# Patient Record
Sex: MALE | Race: WHITE | Employment: OTHER | URBAN - METROPOLITAN AREA
[De-identification: names, ages, dates, MRNs, and addresses within clinical notes are randomized per-mention and may not be internally consistent; named-entity substitution may affect disease eponyms.]

---

## 2024-03-28 PROBLEM — M12.811 ROTATOR CUFF ARTHROPATHY OF RIGHT SHOULDER: Status: ACTIVE | Noted: 2024-03-28

## 2024-03-28 PROBLEM — I11.9 HYPERTENSIVE LEFT VENTRICULAR HYPERTROPHY: Status: ACTIVE | Noted: 2024-03-28

## 2024-03-28 PROBLEM — M23.90 INTERNAL DERANGEMENT OF KNEE: Status: ACTIVE | Noted: 2024-03-28

## 2024-03-28 PROBLEM — I77.89 ENLARGED THORACIC AORTA (CMS-HCC): Status: ACTIVE | Noted: 2024-03-28

## 2024-03-28 PROBLEM — M75.22 BICEPS TENDINITIS OF LEFT UPPER EXTREMITY: Status: ACTIVE | Noted: 2024-03-28

## 2024-03-28 PROBLEM — Q25.43 AORTIC ROOT ANEURYSM: Status: ACTIVE | Noted: 2024-03-28

## 2024-03-28 PROBLEM — R07.9 CHEST PAIN: Status: ACTIVE | Noted: 2024-03-28

## 2024-03-28 PROBLEM — Z86.718 HISTORY OF DVT (DEEP VEIN THROMBOSIS): Status: ACTIVE | Noted: 2024-03-28

## 2024-03-28 PROBLEM — I26.99 PULMONARY EMBOLISM (MULTI): Status: ACTIVE | Noted: 2024-03-28

## 2024-03-28 PROBLEM — M25.512 LEFT SHOULDER PAIN: Status: ACTIVE | Noted: 2024-03-28

## 2024-03-28 PROBLEM — J32.9 SINUS INFECTION: Status: ACTIVE | Noted: 2024-03-28

## 2024-03-28 PROBLEM — S83.90XA KNEE SPRAIN: Status: ACTIVE | Noted: 2024-03-28

## 2024-03-28 PROBLEM — M71.169 SEPTIC PREPATELLAR BURSITIS: Status: ACTIVE | Noted: 2024-03-28

## 2024-03-28 PROBLEM — I77.810 ASCENDING AORTA DILATION (CMS-HCC): Status: ACTIVE | Noted: 2024-03-28

## 2024-03-28 PROBLEM — E78.2 MIXED HYPERLIPIDEMIA: Status: ACTIVE | Noted: 2024-03-28

## 2024-03-28 PROBLEM — C18.9 COLON CANCER (MULTI): Status: ACTIVE | Noted: 2024-03-28

## 2024-03-28 PROBLEM — J31.0 RHINITIS, CHRONIC: Status: ACTIVE | Noted: 2024-03-28

## 2024-03-28 PROBLEM — K22.2 SCHATZKI'S RING: Status: ACTIVE | Noted: 2024-03-28

## 2024-03-28 PROBLEM — M25.511 RIGHT SHOULDER PAIN: Status: ACTIVE | Noted: 2024-03-28

## 2024-03-28 PROBLEM — M17.9 KNEE OSTEOARTHRITIS: Status: ACTIVE | Noted: 2024-03-28

## 2024-03-28 PROBLEM — M25.569 KNEE PAIN: Status: ACTIVE | Noted: 2024-03-28

## 2024-03-28 PROBLEM — I80.9 THROMBOPHLEBITIS: Status: ACTIVE | Noted: 2024-03-28

## 2024-03-28 PROBLEM — I10 ESSENTIAL HYPERTENSION: Status: ACTIVE | Noted: 2024-03-28

## 2024-03-28 PROBLEM — I71.21 AORTIC ROOT ANEURYSM (CMS-HCC): Status: ACTIVE | Noted: 2024-03-28

## 2024-03-28 PROBLEM — Z78.9 NEVER SMOKED ANY SUBSTANCE: Status: ACTIVE | Noted: 2024-03-28

## 2024-03-28 RX ORDER — HYDROCHLOROTHIAZIDE 25 MG/1
25 TABLET ORAL DAILY
COMMUNITY
Start: 2015-02-12

## 2024-03-28 RX ORDER — SODIUM FLUORIDE1.1%, POTASSIUM NITRATE 5% 5.8; 57.5 MG/ML; MG/ML
GEL, DENTIFRICE DENTAL
COMMUNITY
Start: 2015-11-25

## 2024-03-28 RX ORDER — METOPROLOL TARTRATE 25 MG/1
25 TABLET, FILM COATED ORAL 2 TIMES DAILY
COMMUNITY

## 2024-03-28 RX ORDER — WARFARIN 2.5 MG/1
2.5 TABLET ORAL
COMMUNITY

## 2024-03-28 RX ORDER — MONTELUKAST SODIUM 10 MG/1
1 TABLET ORAL EVERY EVENING
COMMUNITY

## 2024-03-28 RX ORDER — CALCIUM CARBONATE/VITAMIN D3 600 MG-10
TABLET ORAL
COMMUNITY

## 2024-03-28 RX ORDER — ROSUVASTATIN CALCIUM 10 MG/1
1 TABLET, COATED ORAL NIGHTLY
COMMUNITY
Start: 2022-07-02

## 2024-03-28 RX ORDER — OMEPRAZOLE 20 MG/1
CAPSULE, DELAYED RELEASE ORAL
COMMUNITY
Start: 2015-01-29

## 2024-03-28 RX ORDER — CETIRIZINE HYDROCHLORIDE 10 MG/1
10 TABLET ORAL
COMMUNITY

## 2024-03-28 RX ORDER — TAMSULOSIN HYDROCHLORIDE 0.4 MG/1
0.4 CAPSULE ORAL DAILY
COMMUNITY
Start: 2014-12-02

## 2024-04-10 ENCOUNTER — OFFICE VISIT (OUTPATIENT)
Dept: ORTHOPEDIC SURGERY | Facility: CLINIC | Age: 79
End: 2024-04-10
Payer: MEDICARE

## 2024-04-10 DIAGNOSIS — M17.10 ARTHRITIS OF KNEE: Primary | ICD-10-CM

## 2024-04-10 PROCEDURE — 1159F MED LIST DOCD IN RCRD: CPT | Performed by: FAMILY MEDICINE

## 2024-04-10 PROCEDURE — 1160F RVW MEDS BY RX/DR IN RCRD: CPT | Performed by: FAMILY MEDICINE

## 2024-04-10 PROCEDURE — 99213 OFFICE O/P EST LOW 20 MIN: CPT | Performed by: FAMILY MEDICINE

## 2024-04-10 NOTE — PROGRESS NOTES
Established Patient Follow-Up Visit    CC:   Chief Complaint   Patient presents with    Right Knee - Follow-up     lOV- 8/18/23 SP Orthovisc injection completed    Left Knee - Follow-up     lOV- 8/18/23 SP Orthovisc injection completed       HPI:  Emmanuel is a 78 y.o. male returns here today for follow-up visit regarding: Bilateral knee pain osteoarthritis.  He states his left knee is little more symptomatic than the right.  He states full range of motion and strength he does have some left knee discomfort with twisting motions.  He states sometimes if his foot were to get stuck in the dirt or the mud he has to be very careful.  When doing any work on his hands and knees he is able to utilize pads and offload the weightbearing.  He denies any significant pain or discomfort of the shoulders at present in the past we have injected both shoulders his right shoulder feels to be a little bit worse than the left but is not in dire need for an injection.  He does states some overuse activities such as painting and other projects around the house where if he is reaching up overhead he gets some soreness to the right shoulder but not so much on the left.  He is interested in repeat gel injections for his bilateral knees.  He prefers to utilize a compression sleeve to his left knee for comfort.          REVIEW OF SYSTEMS:  GENERAL: Negative for malaise, significant weight loss, fever  MUSCULOSKELETAL: See HPI  NEURO: Negative for numbness / tingling       PHYSICAL EXAM:  -Neuro: Gross sensation intact to the lower extremities bilaterally.  -Extremity: Left knee exam demonstrates skin which is warm pink well-perfused no open cuts wounds or sores no obvious joint effusion.  Minimal tenderness palpation to the medial and lateral joint line there is some discomfort with modified Speedy and Apley testing.  The calf is otherwise soft and nontender 5 out of 5 strength is noted.  Right knee exam demonstrates skin which is also  warm pink well-perfused no presence of an effusion no crepitus noted he has full flexion extension about the knee with no discomfort with Speedy or Apley testing.  Ambulates with a normal gait.    IMAGING: No new imaging today      PROCEDURE: None  Procedures     ASSESSMENT:   Follow-up visit for:  Problem List Items Addressed This Visit    None  Visit Diagnoses       Arthritis of knee    -  Primary             PLAN: At this time we will submit for bilateral viscosupplementation injections for his knees.  We discussed that hopefully will get approved for single injections however he has tolerated the 3 part series in the past.  Will submit for those injections with patient follow-up once approved for treatment.  He will continue with activities as tolerated.    In conclusion, this patient has osteoarthritis of the knee which is symptomatic.  This is causing significant morning stiffness lasting over an hour.  The patient has popping clicking and grinding in the knee with range of motion that is decreased and gets worse with prolonged standing or going up and down stairs.  This affects functional activities and activities of daily living.  There is radiographic evidence of osteoarthritis with joint space narrowing and marginal osteophyte formation.  This patient has also failed nonpharmacologic treatment for osteoarthritis including attempts at weight loss, and a home exercise program with or without physical therapy.  The patient has also failed pharmacologic treatments for osteoarthritis including over-the-counter analgesics, anti-inflammatory medication as well as injectable treatments.  For these reasons I feel the patient is a candidate for Visco supplementation injections of the knee.  No orders of the defined types were placed in this encounter.          At the conclusion of the visit there were no further questions by the patient/family regarding their plan of care.  Patient was instructed to call or return  with any issues, questions, or concerns regarding their injury and/or treatment plan described above.     04/10/24 at 10:32 AM - Cole C Budinsky, MD    Office: [phone]    This note was prepared using voice recognition software.  The details of this note are correct and have been reviewed, and corrected to the best of my ability.  Some grammatical errors may persist related to the Dragon software.

## 2024-05-29 ENCOUNTER — OFFICE VISIT (OUTPATIENT)
Dept: ORTHOPEDIC SURGERY | Facility: CLINIC | Age: 79
End: 2024-05-29
Payer: MEDICARE

## 2024-05-29 ENCOUNTER — HOSPITAL ENCOUNTER (OUTPATIENT)
Dept: RADIOLOGY | Facility: EXTERNAL LOCATION | Age: 79
Discharge: HOME | End: 2024-05-29

## 2024-05-29 DIAGNOSIS — M17.10 ARTHRITIS OF KNEE: ICD-10-CM

## 2024-05-29 PROCEDURE — 1159F MED LIST DOCD IN RCRD: CPT | Performed by: FAMILY MEDICINE

## 2024-05-29 PROCEDURE — 1160F RVW MEDS BY RX/DR IN RCRD: CPT | Performed by: FAMILY MEDICINE

## 2024-05-29 PROCEDURE — 2500000004 HC RX 250 GENERAL PHARMACY W/ HCPCS (ALT 636 FOR OP/ED): Mod: JZ | Performed by: FAMILY MEDICINE

## 2024-05-29 PROCEDURE — 20611 DRAIN/INJ JOINT/BURSA W/US: CPT | Mod: 50 | Performed by: FAMILY MEDICINE

## 2024-05-29 RX ADMIN — Medication 30 MG: at 10:45

## 2024-06-05 ENCOUNTER — HOSPITAL ENCOUNTER (OUTPATIENT)
Dept: RADIOLOGY | Facility: EXTERNAL LOCATION | Age: 79
Discharge: HOME | End: 2024-06-05

## 2024-06-05 ENCOUNTER — OFFICE VISIT (OUTPATIENT)
Dept: ORTHOPEDIC SURGERY | Facility: CLINIC | Age: 79
End: 2024-06-05
Payer: MEDICARE

## 2024-06-05 DIAGNOSIS — M17.10 ARTHRITIS OF KNEE: ICD-10-CM

## 2024-06-05 PROCEDURE — 20611 DRAIN/INJ JOINT/BURSA W/US: CPT | Mod: 50 | Performed by: FAMILY MEDICINE

## 2024-06-05 PROCEDURE — 2500000004 HC RX 250 GENERAL PHARMACY W/ HCPCS (ALT 636 FOR OP/ED): Mod: JZ | Performed by: FAMILY MEDICINE

## 2024-06-05 RX ADMIN — Medication 30 MG: at 10:36

## 2024-06-05 NOTE — PROGRESS NOTES
"Patient ID: Emmanuel Osborne \"Violette" is a 79 y.o. male.    L Inj/Asp: bilateral knee on 6/5/2024 10:36 AM  Indications: pain and joint swelling  Details: 22 G needle, ultrasound-guided superolateral approach  Medications (Right): 30 mg hyaluronan 30 mg/2 mL  Medications (Left): 30 mg hyaluronan 30 mg/2 mL  Outcome: tolerated well, no immediate complications  Procedure, treatment alternatives, risks and benefits explained, specific risks discussed. Consent was given by the patient. Immediately prior to procedure a time out was called to verify the correct patient, procedure, equipment, support staff and site/side marked as required. Patient was prepped and draped in the usual sterile fashion.           Patient presents here today for bilateral Orthovisc No. 2 of 3.  He tolerated the previous injections well.  He has a scheduled follow-up for his last visit.  Routine postprocedural precautions given given.  See him back next week.  At the conclusion of the visit there were no further questions by the patient/family regarding their plan of care.  Patient was instructed to call or return with any issues, questions, or concerns regarding their injury and/or treatment plan described above.    This note was prepared using voice recognition software.  The details of this note are correct and have been reviewed, and corrected to the best of my ability.  Some grammatical errors may persist related to the Dragon software     Cole C. Budinsky, MD  Office: [phone]      "

## 2024-06-12 ENCOUNTER — HOSPITAL ENCOUNTER (OUTPATIENT)
Dept: RADIOLOGY | Facility: EXTERNAL LOCATION | Age: 79
Discharge: HOME | End: 2024-06-12

## 2024-06-12 ENCOUNTER — OFFICE VISIT (OUTPATIENT)
Dept: ORTHOPEDIC SURGERY | Facility: CLINIC | Age: 79
End: 2024-06-12
Payer: MEDICARE

## 2024-06-12 DIAGNOSIS — M17.10 ARTHRITIS OF KNEE: ICD-10-CM

## 2024-06-12 PROCEDURE — 20611 DRAIN/INJ JOINT/BURSA W/US: CPT | Mod: 50 | Performed by: FAMILY MEDICINE

## 2024-06-12 PROCEDURE — 2500000004 HC RX 250 GENERAL PHARMACY W/ HCPCS (ALT 636 FOR OP/ED): Mod: JZ | Performed by: FAMILY MEDICINE

## 2024-06-12 NOTE — PROGRESS NOTES
"Patient ID: Emmanuel Osborne \"Violette" is a 79 y.o. male.    L Inj/Asp: bilateral knee on 6/12/2024 12:05 PM  Indications: pain and joint swelling  Details: 22 G needle, ultrasound-guided superolateral approach  Medications (Right): 30 mg hyaluronan 30 mg/2 mL  Medications (Left): 30 mg hyaluronan 30 mg/2 mL  Outcome: tolerated well, no immediate complications  Procedure, treatment alternatives, risks and benefits explained, specific risks discussed. Consent was given by the patient. Immediately prior to procedure a time out was called to verify the correct patient, procedure, equipment, support staff and site/side marked as required. Patient was prepped and draped in the usual sterile fashion.           Patient returns for third of 3 Orthovisc injections here today.  He tolerated the last few without issue.  Denies any other issues or concerns.    Will plan on seeing the patient back in 6-month intervals or sooner if necessary for any further issues otherwise he will call to schedule repeat gel injections with my  or team in 6 months time.    At the conclusion of the visit there were no further questions by the patient/family regarding their plan of care.  Patient was instructed to call or return with any issues, questions, or concerns regarding their injury and/or treatment plan described above.    This note was prepared using voice recognition software.  The details of this note are correct and have been reviewed, and corrected to the best of my ability.  Some grammatical errors may persist related to the Dragon software     Cole C. Budinsky, MD  Office: [phone]  "

## 2024-08-14 ENCOUNTER — HOSPITAL ENCOUNTER (OUTPATIENT)
Dept: CARDIOLOGY | Facility: CLINIC | Age: 79
Discharge: HOME | End: 2024-08-14
Payer: MEDICARE

## 2024-08-14 VITALS
DIASTOLIC BLOOD PRESSURE: 60 MMHG | HEIGHT: 71 IN | SYSTOLIC BLOOD PRESSURE: 145 MMHG | BODY MASS INDEX: 24.22 KG/M2 | WEIGHT: 173 LBS

## 2024-08-14 DIAGNOSIS — I77.810 ASCENDING AORTA DILATATION (CMS-HCC): ICD-10-CM

## 2024-08-14 LAB
AORTIC VALVE MEAN GRADIENT: 3 MMHG
AORTIC VALVE PEAK VELOCITY: 1.33 M/S
AV PEAK GRADIENT: 7.1 MMHG
AVA (PEAK VEL): 3.88 CM2
AVA (VTI): 3.97 CM2
EJECTION FRACTION APICAL 4 CHAMBER: 60.5
EJECTION FRACTION: 63 %
LEFT VENTRICLE INTERNAL DIMENSION DIASTOLE: 3.8 CM (ref 3.5–6)
LEFT VENTRICULAR OUTFLOW TRACT DIAMETER: 2.4 CM
LV EJECTION FRACTION BIPLANE: 62 %
MITRAL VALVE E/A RATIO: 0.87
MITRAL VALVE E/E' RATIO: 12.8
RIGHT VENTRICLE PEAK SYSTOLIC PRESSURE: 29.8 MMHG

## 2024-08-14 PROCEDURE — 93306 TTE W/DOPPLER COMPLETE: CPT | Performed by: INTERNAL MEDICINE

## 2024-08-14 PROCEDURE — 93306 TTE W/DOPPLER COMPLETE: CPT

## 2024-08-28 ENCOUNTER — APPOINTMENT (OUTPATIENT)
Dept: CARDIOLOGY | Facility: CLINIC | Age: 79
End: 2024-08-28
Payer: MEDICARE

## 2024-08-28 VITALS
BODY MASS INDEX: 24.14 KG/M2 | SYSTOLIC BLOOD PRESSURE: 132 MMHG | HEIGHT: 71 IN | WEIGHT: 172.4 LBS | DIASTOLIC BLOOD PRESSURE: 70 MMHG | HEART RATE: 60 BPM

## 2024-08-28 DIAGNOSIS — Z78.9 NEVER SMOKED ANY SUBSTANCE: ICD-10-CM

## 2024-08-28 DIAGNOSIS — I10 ESSENTIAL HYPERTENSION: ICD-10-CM

## 2024-08-28 DIAGNOSIS — Z86.718 HISTORY OF DVT (DEEP VEIN THROMBOSIS): ICD-10-CM

## 2024-08-28 DIAGNOSIS — E78.2 MIXED HYPERLIPIDEMIA: ICD-10-CM

## 2024-08-28 DIAGNOSIS — I77.810 ASCENDING AORTA DILATION (CMS-HCC): ICD-10-CM

## 2024-08-28 DIAGNOSIS — I26.99 PULMONARY EMBOLISM, UNSPECIFIED CHRONICITY, UNSPECIFIED PULMONARY EMBOLISM TYPE, UNSPECIFIED WHETHER ACUTE COR PULMONALE PRESENT (MULTI): ICD-10-CM

## 2024-08-28 PROCEDURE — 1036F TOBACCO NON-USER: CPT | Performed by: INTERNAL MEDICINE

## 2024-08-28 PROCEDURE — 99214 OFFICE O/P EST MOD 30 MIN: CPT | Performed by: INTERNAL MEDICINE

## 2024-08-28 PROCEDURE — 3078F DIAST BP <80 MM HG: CPT | Performed by: INTERNAL MEDICINE

## 2024-08-28 PROCEDURE — 1159F MED LIST DOCD IN RCRD: CPT | Performed by: INTERNAL MEDICINE

## 2024-08-28 PROCEDURE — 3075F SYST BP GE 130 - 139MM HG: CPT | Performed by: INTERNAL MEDICINE

## 2024-08-28 NOTE — PATIENT INSTRUCTIONS
Will plan to repeat your Echocardiogram in 3 years  Follow up office visit in 1 year.    Continue same medications/treatment.  Patient educated on proper medication use.  Patient educated on risk factor modification.  Please bring any lab results from other providers / physicians to your next appointment.    Please bring all medicines, vitamins and herbal supplements with you when you come to the office.    Prescriptions will not be filled unless you are compliant with your follow up appointments or have a follow up  appointment scheduled as per instruction of your physician.  Refills should be requested at the time of  Your visit.    Marylu CROWELL LPN, am scribing for and in the presence of Dr. Roberth Chaudhari MD, FACC

## 2024-08-28 NOTE — PROGRESS NOTES
CARDIOLOGY OFFICE VISIT      CHIEF COMPLAINT      HISTORY OF PRESENT ILLNESS  The patient states he has been doing well as far as he is concerned.  He denies chest discomfort or symptoms of myocardial ischemia.  He denies dyspnea.  He denies pretibial edema.  He denies palpitations and syncope.  He denies any problem with his current medication.  I did go over his echocardiogram with him from earlier in August of this year.  There is no significant change.  His lipid profile from 4 months ago demonstrates cholesterol 118, triglycerides 126, HDL 48, LDL 45.      IMPRESSION:   1. Essential hypertension.  2. Mixed hyperlipidemia.  3. 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  4. Remote deep vein thrombosis of lower extremities and pulmonary  embolism in 2009.  5. Recurrent DVT of right lower extremity, on chronic Coumadin therapy     Please excuse any errors in grammar or translation related to this dictation. Voice recognition software was utilized to prepare this document.               Past Medical History  Past Medical History:   Diagnosis Date    Hypertension 1995    Personal history of other diseases of the circulatory system     History of abnormal electrocardiography       Social History  Social History     Tobacco Use    Smoking status: Never    Smokeless tobacco: Never   Substance Use Topics    Alcohol use: Not Currently     Alcohol/week: 1.0 standard drink of alcohol     Types: 1 Cans of beer per week    Drug use: Never       Family History     Family History   Problem Relation Name Age of Onset    Coronary artery disease Mother Angela Bauer     Other (myocardial infarction) Mother Angela Bauer     Heart attack Mother Angela Bauer     Aortic aneurysm Father      Coronary artery disease Father      Other (myocardial infarction) Father      Coronary artery disease Brother          Allergies:  Allergies   Allergen Reactions    Ciprofloxacin Unknown    House Dust Mite Unknown     (Dust Mite)    Sulfa (Sulfonamide  Antibiotics) Unknown        Outpatient Medications:  Current Outpatient Medications   Medication Instructions    cetirizine (ZYRTEC) 10 mg, oral, 2x DAILY    hydroCHLOROthiazide (HYDRODIURIL) 25 mg, oral, Daily    LISINOPRIL ORAL 1 tablet, oral, 2 times daily    MELATONIN ORAL 3 mg, oral, TAKE TABLET as DIRECTED    metoprolol tartrate (LOPRESSOR) 25 mg, oral, 2 times daily    montelukast (Singulair) 10 mg tablet 1 tablet, oral, Every evening    omega-3 fatty acids-fish oil (One-Per-Day Omega-3) 684-1,200 mg capsule oral, TAKE as DIRECTED    omeprazole (PriLOSEC) 20 mg DR capsule oral, As DIRECTED    rosuvastatin (Crestor) 10 mg tablet 1 tablet, oral, Nightly    sodium fluoride-pot nitrate 1.1-5 % paste dental, After brushing, leave on for 1 minute, exporate exce    tamsulosin (FLOMAX) 0.4 mg, oral, Daily    warfarin (JANTOVEN) 2.5 mg, oral, Take as directed per After Visit Summary.  TAKE BY MOUTH  as DIRECTED BY THE [facility]          REVIEW OF SYSTEMS  Review of Systems   All other systems reviewed and are negative.        VITALS  Vitals:    08/28/24 1015   BP: 132/70   Pulse: 60       PHYSICAL EXAM  Constitutional:       Appearance: Healthy appearance. Not in distress.   Eyes:      Conjunctiva/sclera: Conjunctivae normal.      Pupils: Pupils are equal, round, and reactive to light.   Neck:      Vascular: No JVR. JVD normal.   Pulmonary:      Effort: Pulmonary effort is normal.      Breath sounds: Normal breath sounds. No wheezing. No rhonchi. No rales.   Chest:      Chest wall: Not tender to palpatation.   Cardiovascular:      PMI at left midclavicular line. Normal rate. Regular rhythm. Normal S1. Normal S2.       Murmurs: There is no murmur.      No gallop.  No click. No rub.   Pulses:     Intact distal pulses.   Edema:     Peripheral edema absent.   Abdominal:      Tenderness: There is no abdominal tenderness.   Musculoskeletal: Normal range of motion.         General: No tenderness.      Cervical  back: Normal range of motion. Skin:     General: Skin is warm and dry.   Neurological:      General: No focal deficit present.      Mental Status: Alert and oriented to person, place and time.           ASSESSMENT AND PLAN  Diagnoses and all orders for this visit:  Essential hypertension  Mixed hyperlipidemia  Ascending aorta dilation (CMS-HCC)  History of DVT (deep vein thrombosis)  Pulmonary embolism, unspecified chronicity, unspecified pulmonary embolism type, unspecified whether acute cor pulmonale present (Multi)  Body mass index (BMI) of 24.0 to 24.9 in adult  Never smoked any substance      [unfilled]

## 2025-04-09 ENCOUNTER — OFFICE VISIT (OUTPATIENT)
Dept: ORTHOPEDIC SURGERY | Facility: CLINIC | Age: 80
End: 2025-04-09
Payer: MEDICARE

## 2025-04-09 DIAGNOSIS — M76.61 RIGHT ACHILLES TENDINITIS: ICD-10-CM

## 2025-04-09 PROCEDURE — 99213 OFFICE O/P EST LOW 20 MIN: CPT | Performed by: FAMILY MEDICINE

## 2025-04-09 PROCEDURE — 1160F RVW MEDS BY RX/DR IN RCRD: CPT | Performed by: FAMILY MEDICINE

## 2025-04-09 PROCEDURE — 1036F TOBACCO NON-USER: CPT | Performed by: FAMILY MEDICINE

## 2025-04-09 PROCEDURE — L4397 STATIC OR DYNAMI AFO PRE OTS: HCPCS | Performed by: FAMILY MEDICINE

## 2025-04-09 PROCEDURE — 1159F MED LIST DOCD IN RCRD: CPT | Performed by: FAMILY MEDICINE
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
## 2025-04-17 ENCOUNTER — APPOINTMENT (OUTPATIENT)
Dept: ORTHOPEDIC SURGERY | Facility: CLINIC | Age: 80
End: 2025-04-17
Payer: MEDICARE

## 2025-04-23 ENCOUNTER — HOSPITAL ENCOUNTER (OUTPATIENT)
Dept: RADIOLOGY | Facility: EXTERNAL LOCATION | Age: 80
Discharge: HOME | End: 2025-04-23

## 2025-04-23 ENCOUNTER — OFFICE VISIT (OUTPATIENT)
Dept: ORTHOPEDIC SURGERY | Facility: CLINIC | Age: 80
End: 2025-04-23
Payer: MEDICARE

## 2025-04-23 DIAGNOSIS — M17.10 ARTHRITIS OF KNEE: ICD-10-CM

## 2025-04-23 PROCEDURE — 1159F MED LIST DOCD IN RCRD: CPT | Performed by: FAMILY MEDICINE

## 2025-04-23 PROCEDURE — 20611 DRAIN/INJ JOINT/BURSA W/US: CPT | Mod: 50 | Performed by: FAMILY MEDICINE

## 2025-04-23 PROCEDURE — 2500000004 HC RX 250 GENERAL PHARMACY W/ HCPCS (ALT 636 FOR OP/ED): Mod: JZ | Performed by: FAMILY MEDICINE

## 2025-04-23 PROCEDURE — 99211 OFF/OP EST MAY X REQ PHY/QHP: CPT | Performed by: FAMILY MEDICINE

## 2025-04-23 PROCEDURE — 1036F TOBACCO NON-USER: CPT | Performed by: FAMILY MEDICINE

## 2025-04-23 RX ADMIN — Medication 16 MG: at 09:52

## 2025-04-23 NOTE — PROGRESS NOTES
"Patient ID: Emmanuel Osborne \"Violette" is a 79 y.o. male.    L Inj/Asp: bilateral knee on 4/23/2025 9:52 AM  Indications: pain and joint swelling  Details: 22 G needle, ultrasound-guided superolateral approach  Medications (Right): 16 mg hylan 16 mg/2 mL  Medications (Left): 16 mg hylan 16 mg/2 mL  Outcome: tolerated well, no immediate complications  Procedure, treatment alternatives, risks and benefits explained, specific risks discussed. Consent was given by the patient. Immediately prior to procedure a time out was called to verify the correct patient, procedure, equipment, support staff and site/side marked as required. Patient was prepped and draped in the usual sterile fashion.       Patient returns here today for bilateral knee Synvisc injections first of 3.  Routine postprocedure precautions were provided.  Follow-up as scheduled for the subsequent 2nd and 3rd injections    We briefly discussed his right Achilles tendon which has some minimal swelling and no significant discomfort, recommend he continue with the boot icing and recommended topical Voltaren gel we will keep an eye on this going forward.  Additionally he had discussed concerns for arthritis pain to the bilateral hands pointing to the first MCPs.    Next visit: Will obtain bilateral hand x-rays and discuss this further at follow-up.    At the conclusion of the visit there were no further questions by the patient/family regarding their plan of care.  Patient was instructed to call or return with any issues, questions, or concerns regarding their injury and/or treatment plan described above.    This note was prepared using voice recognition software.  The details of this note are correct and have been reviewed, and corrected to the best of my ability.  Some grammatical errors may persist related to the Dragon software     Cole C. Budinsky, MD  Office: [phone]  "

## 2025-04-30 ENCOUNTER — OFFICE VISIT (OUTPATIENT)
Dept: ORTHOPEDIC SURGERY | Facility: CLINIC | Age: 80
End: 2025-04-30
Payer: MEDICARE

## 2025-04-30 ENCOUNTER — HOSPITAL ENCOUNTER (OUTPATIENT)
Dept: RADIOLOGY | Facility: EXTERNAL LOCATION | Age: 80
Discharge: HOME | End: 2025-04-30

## 2025-04-30 ENCOUNTER — HOSPITAL ENCOUNTER (OUTPATIENT)
Dept: RADIOLOGY | Facility: CLINIC | Age: 80
Discharge: HOME | End: 2025-04-30
Payer: MEDICARE

## 2025-04-30 DIAGNOSIS — M19.049 ARTHRITIS PAIN OF HAND: ICD-10-CM

## 2025-04-30 DIAGNOSIS — M17.0 PRIMARY OSTEOARTHRITIS OF BOTH KNEES: Primary | ICD-10-CM

## 2025-04-30 PROCEDURE — 73130 X-RAY EXAM OF HAND: CPT | Mod: 50

## 2025-04-30 PROCEDURE — 99213 OFFICE O/P EST LOW 20 MIN: CPT | Performed by: FAMILY MEDICINE

## 2025-04-30 PROCEDURE — 1160F RVW MEDS BY RX/DR IN RCRD: CPT | Performed by: FAMILY MEDICINE

## 2025-04-30 PROCEDURE — 2500000004 HC RX 250 GENERAL PHARMACY W/ HCPCS (ALT 636 FOR OP/ED): Mod: JZ | Performed by: FAMILY MEDICINE

## 2025-04-30 PROCEDURE — 1159F MED LIST DOCD IN RCRD: CPT | Performed by: FAMILY MEDICINE

## 2025-04-30 PROCEDURE — 1036F TOBACCO NON-USER: CPT | Performed by: FAMILY MEDICINE

## 2025-04-30 PROCEDURE — 20611 DRAIN/INJ JOINT/BURSA W/US: CPT | Mod: 50 | Performed by: FAMILY MEDICINE

## 2025-04-30 RX ADMIN — Medication 16 MG: at 12:41

## 2025-04-30 RX ADMIN — Medication 48 MG: at 12:41

## 2025-04-30 NOTE — PROGRESS NOTES
Follow-Up Patient Visit: Upper Extremity Injury  Assessment & Plan  Patient was provided with bilateral knee injections for the 2nd and 3rd Synvisc injections today.  Will see him back next week for his third and final injections.  Regarding the bilateral upper extremities very minimal arthritis seen on x-rays continue with Voltaren gel and maintaining good activity as tolerated if necessary will consider corticosteroid injections down the road.    Orders Placed This Encounter    L Inj/Asp: bilateral knee    Point of Care Ultrasound    XR hand 3+ views bilateral     1. Primary osteoarthritis of both knees    2. Arthritis pain of hand      L Inj/Asp: bilateral knee on 4/30/2025 12:41 PM  Indications: pain and joint swelling  Details: 22 G needle, ultrasound-guided superolateral approach  Medications (Right): 16 mg hylan 16 mg/2 mL  Medications (Left): 48 mg hylan 48 mg/6 mL  Outcome: tolerated well, no immediate complications  Procedure, treatment alternatives, risks and benefits explained, specific risks discussed. Consent was given by the patient. Immediately prior to procedure a time out was called to verify the correct patient, procedure, equipment, support staff and site/side marked as required. Patient was prepped and draped in the usual sterile fashion.         At the conclusion of the visit there were no further questions by the patient/family regarding their plan of care.  Patient was instructed to call or return with any issues, questions, or concerns regarding their injury and/or treatment plan described above.    PHYSICAL EXAM:  Neuro: Gross sensation intact to the upper extremities bilaterally.  Upper Extremity: Bilateral hand and wrist exam demonstrate skin which is warm pink well-perfused no crepitus clicking or popping skin is without any open cuts or sores.  Distal pulses and sensation are intact.  Physical Exam      IMAGING:   Results    XR hand 3+ views bilateral  Narrative: Interpreted By:   "Budinsky, Cole,   STUDY:  XR HAND 3+ VIEWS BILATERAL; ;  4/30/2025 10:05 am      INDICATION:  Signs/Symptoms:pain.      ACCESSION NUMBER(S):  [id number]      ORDERING CLINICIAN:  COLE BUDINSKY      Impression: Bilateral hand films demonstrate no presence for acute fracture or  dislocation. Sequela of previous injury and history of right ulnar  styloid process fracture noted. Minimal degenerative changes are seen  throughout the MCPs. No significant osteoarthritic changes about the  CMC joints.          Signed by: Cole Budinsky 4/30/2025 12:39 PM  Dictation workstation:   [id number]  Point of Care Ultrasound  These images are not reportable by radiology and will not be interpreted   by  Radiologists.       HPI  Patient ID: Emmanuel Osborne \"Dimas\" is a 79 y.o. male who presents for Injections of the Right Knee (Synvisc # 2), Injections of the Left Knee (Synvisc #2), Pain of the Left Hand (Xray today), and Pain of the Right Hand (Xray today).  History of Present Illness    Patient presents here today for follow-up and second injection for bilateral knee OA, also has pain and discomfort in the hands which has been going on for a while and discussed previously he would like to get x-rays and have me take a look at his hands and wrist today.  Sounds like he has been using the Voltaren gel with decent success and denies a need for an injection to the hand or thumb.          This medical note was created with the assistance of artificial intelligence (AI) for documentation purposes. The content has been reviewed and confirmed by the healthcare provider for accuracy and completeness. Patient consented to the use of audio recording and use of AI during their visit.   04/30/25 at 12:43 PM - Cole C Budinsky, MD  Office:  [phone]  "

## 2025-05-09 ENCOUNTER — HOSPITAL ENCOUNTER (OUTPATIENT)
Dept: RADIOLOGY | Facility: EXTERNAL LOCATION | Age: 80
Discharge: HOME | End: 2025-05-09

## 2025-05-09 ENCOUNTER — OFFICE VISIT (OUTPATIENT)
Dept: ORTHOPEDIC SURGERY | Facility: CLINIC | Age: 80
End: 2025-05-09
Payer: MEDICARE

## 2025-05-09 DIAGNOSIS — M17.0 PRIMARY OSTEOARTHRITIS OF BOTH KNEES: ICD-10-CM

## 2025-05-09 PROCEDURE — 2500000004 HC RX 250 GENERAL PHARMACY W/ HCPCS (ALT 636 FOR OP/ED): Mod: JZ | Performed by: FAMILY MEDICINE

## 2025-05-09 PROCEDURE — 20611 DRAIN/INJ JOINT/BURSA W/US: CPT | Mod: 50 | Performed by: FAMILY MEDICINE

## 2025-05-09 RX ADMIN — Medication 16 MG: at 09:53

## 2025-05-09 NOTE — PROGRESS NOTES
"Patient ID: Emmanuel Osborne \"Violette" is a 79 y.o. male.    L Inj/Asp: bilateral knee on 5/9/2025 9:53 AM  Indications: pain and joint swelling  Details: 22 G needle, ultrasound-guided superolateral approach  Medications (Right): 16 mg hylan 16 mg/2 mL  Medications (Left): 16 mg hylan 16 mg/2 mL  Outcome: tolerated well, no immediate complications  Procedure, treatment alternatives, risks and benefits explained, specific risks discussed. Consent was given by the patient. Immediately prior to procedure a time out was called to verify the correct patient, procedure, equipment, support staff and site/side marked as required. Patient was prepped and draped in the usual sterile fashion.       Patient returns here today to follow-up his bilateral knee OA here for third and final Synvisc injections to the bilateral knees.  He tolerated this well.  Will see him back as needed or in 3 months going forward for repeat evaluation he does have some ongoing calcaneal pain and right Achilles.  Will offer him some foam and heel lift inserts he will continue with his night splint and call or return with any issues going forward regarding the Achilles or his knees.    At the conclusion of the visit there were no further questions by the patient/family regarding their plan of care.  Patient was instructed to call or return with any issues, questions, or concerns regarding their injury and/or treatment plan described above.    This note was prepared using voice recognition software.  The details of this note are correct and have been reviewed, and corrected to the best of my ability.  Some grammatical errors may persist related to the Dragon software     Cole C. Budinsky, MD  Office: [phone]  "

## 2025-07-15 ENCOUNTER — OFFICE VISIT (OUTPATIENT)
Dept: ORTHOPEDIC SURGERY | Facility: CLINIC | Age: 80
End: 2025-07-15
Payer: MEDICARE

## 2025-07-15 ENCOUNTER — HOSPITAL ENCOUNTER (OUTPATIENT)
Dept: RADIOLOGY | Facility: CLINIC | Age: 80
Discharge: HOME | End: 2025-07-15
Payer: MEDICARE

## 2025-07-15 DIAGNOSIS — S80.02XA CONTUSION OF LEFT KNEE, INITIAL ENCOUNTER: ICD-10-CM

## 2025-07-15 DIAGNOSIS — M70.52 PATELLAR BURSITIS OF LEFT KNEE: ICD-10-CM

## 2025-07-15 DIAGNOSIS — M25.562 LEFT KNEE PAIN, UNSPECIFIED CHRONICITY: ICD-10-CM

## 2025-07-15 PROCEDURE — 1159F MED LIST DOCD IN RCRD: CPT | Performed by: FAMILY MEDICINE

## 2025-07-15 PROCEDURE — 73562 X-RAY EXAM OF KNEE 3: CPT | Mod: LT

## 2025-07-15 PROCEDURE — 99213 OFFICE O/P EST LOW 20 MIN: CPT | Performed by: FAMILY MEDICINE

## 2025-07-15 PROCEDURE — 73562 X-RAY EXAM OF KNEE 3: CPT | Mod: LEFT SIDE | Performed by: FAMILY MEDICINE

## 2025-07-15 PROCEDURE — 1036F TOBACCO NON-USER: CPT | Performed by: FAMILY MEDICINE

## 2025-07-15 PROCEDURE — 99212 OFFICE O/P EST SF 10 MIN: CPT | Performed by: FAMILY MEDICINE

## 2025-07-15 NOTE — PROGRESS NOTES
Acute Injury New Patient Visit  Assessment & Plan  Left knee contusion and prepatellar bursitis  Acute contusion and bursitis post-fall. X-rays negative for fracture. Swelling without infection or instability. Weight-bearing and leg raises intact, no surgery needed.  - Recommend compression sleeve.  - Advise icing to reduce swelling.  - Continue regular exercises.  - Avoid kneeling on hard surfaces.  - Limit activities that exacerbate condition.  - Consider cane or walker for support if needed.  - Re-evaluate on August 8th.  - Consider MRI if pain worsens or persists.    Bilateral knee osteoarthritis  Chronic osteoarthritis with acute symptoms linked to contusion and bursitis. No significant changes on x-rays. Conservative management preferred.  - Consider steroid injection if symptoms persist by next follow-up.    Orders Placed This Encounter    XR knee left 3 views     Procedures   At the conclusion of the visit there were no further questions by the patient/family regarding their plan of care.  Patient was instructed to call or return with any issues, questions, or concerns regarding their injury and/or treatment plan described above.    PHYSICAL EXAM:  General:  Patient is awake, alert, and oriented to person place and time.  Patient appears well nourished and well kept.  Affect Calm, Not Acutely Distressed.  Heent:  Normocephalic, Atraumatic, EOMI  Cardiovascular:  Hemodynamically stable.  Respiratory:  Normal respirations with unlabored breathing.  Neuro: Gross sensation intact to the lower extremities bilaterally.  Extremity: Left knee exam:  Physical Exam  MUSCULOSKELETAL: Left knee with soft tissue swelling, superficial abrasions, and fullness anteriorly. Extensor mechanism full and tight. No obvious bone tenderness. Patella not tender. Quad tendon patella intact, minimally tender.  Calf is soft and nontender.  Full flexion extension about the knee no laxity with valgus or varus stress.  Question small  "joint effusion.    IMAGING:   Results  Left knee X-ray (07/13/2025): No fracture, no significant change in osteoarthritis.  XR knee left 3 views  Narrative: Interpreted By:  Budinsky, Cole,   STUDY:  XR KNEE LEFT 3 VIEWS; ;  7/15/2025 11:04 am      INDICATION:  Signs/Symptoms:pain.      ACCESSION NUMBER(S):  [id number]      ORDERING CLINICIAN:  COLE BUDINSKY      Impression: Left knee films demonstrate no obvious presence for acute fracture or  dislocation. Moderate tricompartmental osteoarthritic changes noted  with well-preserved spacing. Question medial chondrocalcinosis. Small  superior patellar enthesophyte noted. Question minimal joint  effusion. Mild prepatellar and pretibial soft tissue swelling noted.          Signed by: Cole Budinsky 7/15/2025 1:01 PM  Dictation workstation:   [id number]      Patient ID: Emmanuel Noguera" is a 80 y.o. male who presents for No chief complaint on file..  History of Present Illness  Emmanuel Noguera" is an 80 year old male who presents with an acute left knee injury following a fall.    He sustained an acute left knee injury two days ago after falling at [employer] while ascending stairs to the [employer]. He was carrying a  one hand and attempted to grab the railing, resulting in the fall. He takes Coumadin, which necessitated a visit to the emergency room for a head scan.    The initial impact was on the front of the left knee, which has been treated with ice. He has swelling in the knee but denies significant pain. The knee feels stiff and full, particularly when straightening it out, but he is able to bear weight on it. No pain in the calf and no significant change in the usual swelling of the knee.    He has a history of knee problems, including arthritis, and has previously received gel shots for relief, which took about two weeks to take effect. He has not been wearing his usual knee support sleeve recently. The weak point of his knee has been rotational " movements, but he denies any current pain with such movements.    He is concerned about the swelling and fullness in the anterior aspect of the knee, which he describes as 'juiciness'.        This medical note was created with the assistance of artificial intelligence (AI) for documentation purposes. The content has been reviewed and confirmed by the healthcare provider for accuracy and completeness. Patient consented to the use of audio recording and use of AI during their visit.   07/15/25 at 5:52 PM - Cole C Budinsky, MD  Office:  [phone]

## 2025-07-23 ENCOUNTER — OFFICE VISIT (OUTPATIENT)
Dept: ORTHOPEDIC SURGERY | Facility: CLINIC | Age: 80
End: 2025-07-23
Payer: MEDICARE

## 2025-07-23 DIAGNOSIS — M70.52 PATELLAR BURSITIS OF LEFT KNEE: ICD-10-CM

## 2025-07-23 DIAGNOSIS — S80.02XA CONTUSION OF LEFT KNEE, INITIAL ENCOUNTER: ICD-10-CM

## 2025-07-23 PROCEDURE — 1036F TOBACCO NON-USER: CPT | Performed by: FAMILY MEDICINE

## 2025-07-23 PROCEDURE — 99212 OFFICE O/P EST SF 10 MIN: CPT | Performed by: FAMILY MEDICINE

## 2025-07-23 PROCEDURE — 99214 OFFICE O/P EST MOD 30 MIN: CPT | Performed by: FAMILY MEDICINE

## 2025-07-23 PROCEDURE — 1159F MED LIST DOCD IN RCRD: CPT | Performed by: FAMILY MEDICINE

## 2025-07-23 RX ORDER — METHYLPREDNISOLONE 4 MG/1
TABLET ORAL
Qty: 1 EACH | Refills: 0 | Status: SHIPPED | OUTPATIENT
Start: 2025-07-23

## 2025-07-23 NOTE — PROGRESS NOTES
Follow-Up Patient Visit: Lower Extremity Injury  Assessment & Plan  Left knee contusion and prepatellar bursitis  Persistent swelling and pain without infection. Swelling decreased anteriorly, significant overall. Full extension, improved flexion. Mild medial joint line pain, subtle valgus stress laxity. Avoided aspiration due to gel presence and infection risk. Oral steroid chosen for swelling and sinus issues.  - Prescribed Medrol Dosepak: 6 tablets day 1, then 5, 4, 3, 2, 1 tablets each subsequent day.  - Advised icing knee to reduce swelling.  - Monitor for worsening symptoms or infection signs; consider ER visit for aspiration and lab analysis if symptoms worsen.  - Confirmed follow-up appointment schedule.  Consider intra-articular injection with steroid and a very small amount of lidocaine if necessary at follow-up, would like to not aspirate given his recent gel injection.    Orders Placed This Encounter    methylPREDNISolone (Medrol Dospak) 4 mg tablets     1. Contusion of left knee, initial encounter    2. Patellar bursitis of left knee      Procedures  At the conclusion of the visit there were no further questions by the patient/family regarding their plan of care.  Patient was instructed to call or return with any issues, questions, or concerns regarding their injury and/or treatment plan described above.    PHYSICAL EXAM:  Neuro: Gross sensation intact to the lower extremities bilaterally.  Lower extremity: Left knee exam:  Physical Exam  MUSCULOSKELETAL: Left knee with joint effusion, fully extends, flexes past 90 degrees, subtle laxity on valgus stress, medial joint line pain, soft tissue swelling, minimal anterior bruise, and reduced bursitis.  SKIN: Left knee skin no longer red or hot.    IMAGING:   Results    XR knee left 3 views  Narrative: Interpreted By:  Budinsky, Cole,   STUDY:  XR KNEE LEFT 3 VIEWS; ;  7/15/2025 11:04 am      INDICATION:  Signs/Symptoms:pain.      ACCESSION  "NUMBER(S):  [id number]      ORDERING CLINICIAN:  COLE BUDINSKY      Impression: Left knee films demonstrate no obvious presence for acute fracture or  dislocation. Moderate tricompartmental osteoarthritic changes noted  with well-preserved spacing. Question medial chondrocalcinosis. Small  superior patellar enthesophyte noted. Question minimal joint  effusion. Mild prepatellar and pretibial soft tissue swelling noted.          Signed by: Cole Budinsky 7/15/2025 1:01 PM  Dictation workstation:   [id number]       [facility]  Patient ID: Emmanuel Osborne \"Violette" is a 80 y.o. male who presents for Follow-up of the Left Knee (Contusion / acetabular bursitis .).  History of Present Illness  Emmanuel Noguera" is an 80 year old male who presents with left knee pain.    He returns for follow-up of left anterior knee pain, initially diagnosed as patellar bursitis and a knee contusion. The pain began after a recent injury and was initially accompanied by swelling. Despite being informed it was a bruise, he continued with his activities, including a performance in [employer], which exacerbated the pain and swelling during the car ride back.    The swelling has persisted and possibly increased since the last visit. 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 The knee was swollen and painful during the ride back from [employer].    He was recently on a five-day course of azithromycin (Z-Yakov) for a sinus infection. He is concerned that the knee swelling might be systemic, related to his recent illness and treatment. He uses a nasal steroid spray for his chronic rhinitis.    He reports improvement in his ability to walk, stating he walked the best today when coming to the office. He can fully extend and flex the knee better than before, though twisting the knee still causes some pain.            This medical note was created with the assistance of artificial intelligence (AI) for " documentation purposes. The content has been reviewed and confirmed by the healthcare provider for accuracy and completeness. Patient consented to the use of audio recording and use of AI during their visit.     07/23/25 at 10:38 AM - Cole C Budinsky, MD  Office:  [phone]

## 2025-08-08 ENCOUNTER — OFFICE VISIT (OUTPATIENT)
Dept: ORTHOPEDIC SURGERY | Facility: CLINIC | Age: 80
End: 2025-08-08
Payer: MEDICARE

## 2025-08-08 DIAGNOSIS — R27.8 COORDINATION IMPAIRMENT: ICD-10-CM

## 2025-08-08 DIAGNOSIS — R26.89 BALANCE DISORDER: ICD-10-CM

## 2025-08-08 DIAGNOSIS — S80.02XA CONTUSION OF LEFT KNEE, INITIAL ENCOUNTER: Primary | ICD-10-CM

## 2025-08-08 DIAGNOSIS — M70.52 PATELLAR BURSITIS OF LEFT KNEE: ICD-10-CM

## 2025-08-08 PROCEDURE — 99212 OFFICE O/P EST SF 10 MIN: CPT | Performed by: FAMILY MEDICINE

## 2025-08-08 NOTE — PROGRESS NOTES
"  Follow-Up Patient Visit: Lower Extremity Injury  Assessment & Plan  Left knee pain and prepatellar bursitis  Left knee pain improved with minimal swelling. Full extension and flexion achieved. Mild crepitus and minimal prepatellar bursa swelling noted. Side effects from Medrol dosepak discussed. Consideration for future prednisone adjustment due to sensitivity.  - Continue current management without injection.  - Consider smaller, shorter course of oral prednisone if needed going forward down the road.    Balance and coordination difficulties  Balance and coordination issues likely due to previous sinus infection affecting eustachian tubes. Preference for structured physical therapy noted. Benefits of physical and vestibular therapy discussed.  - Refer to physical therapy for balance and coordination.  - Refer to vestibular therapy for balance and coordination.    Orders Placed This Encounter    Referral to Physical Therapy     1. Contusion of left knee, initial encounter    2. Balance disorder    3. Coordination impairment    4. Patellar bursitis of left knee      Procedures  At the conclusion of the visit there were no further questions by the patient/family regarding their plan of care.  Patient was instructed to call or return with any issues, questions, or concerns regarding their injury and/or treatment plan described above.    PHYSICAL EXAM:  Neuro: Gross sensation intact to the lower extremities bilaterally.  Lower extremity: Left knee exam  Physical Exam  MUSCULOSKELETAL: Left knee skin warm, pink, well perfused, minimal swelling over prepatellar bursa. Crepitus present, no joint effusion, full extension and flexion. No laxity with valgus stress. Calf soft, non-tender. No wounds or redness.  Ambulates with minimal antalgic gait.    IMAGING:   Results    No new imaging today.    HPI  Patient ID: Emmanuel Osborne \"Dimas\" is a 80 y.o. male who presents for Follow-up of the Left Knee (Contusion / prepatellar " "bursitis .).  History of Present Illness  Emmanuel Osborne \"Dimas\" is an 80 year old male who presents for follow-up of left knee pain.    He has been exercising and is able to bend his knee fully without significant issues. Overall, he feels there is not much wrong with it. He has been using a knee pad with handles and a foam pad to assist with movements, which has been helpful.    He describes a previous experience with oral prednisone, stating that it made him feel 'on edge' and 'jittery,' particularly towards the end of the course. He inadvertently took all seven tablets at once, which he acknowledges was a mistake. He dislikes prednisone due to these side effects.    He mentions a past sinus infection that he believes was affecting his balance, causing blocked eustachian tubes and impacting his equilibrium. He is seeking recommendations to improve his balance and is interested in exercises or therapy to address this issue.            This medical note was created with the assistance of artificial intelligence (AI) for documentation purposes. The content has been reviewed and confirmed by the healthcare provider for accuracy and completeness. Patient consented to the use of audio recording and use of AI during their visit.     08/08/25 at 11:42 AM - Cole C Budinsky, MD  Office:  [phone]  "

## 2025-08-22 ENCOUNTER — EVALUATION (OUTPATIENT)
Dept: PHYSICAL THERAPY | Facility: CLINIC | Age: 80
End: 2025-08-22
Payer: MEDICARE

## 2025-08-22 DIAGNOSIS — R26.89 IMBALANCE: Primary | ICD-10-CM

## 2025-08-22 PROCEDURE — 97161 PT EVAL LOW COMPLEX 20 MIN: CPT | Mod: GP

## 2025-08-22 PROCEDURE — 97535 SELF CARE MNGMENT TRAINING: CPT | Mod: GP

## 2025-08-27 ENCOUNTER — APPOINTMENT (OUTPATIENT)
Dept: CARDIOLOGY | Facility: CLINIC | Age: 80
End: 2025-08-27
Payer: MEDICARE

## 2025-08-27 VITALS
HEIGHT: 71 IN | DIASTOLIC BLOOD PRESSURE: 68 MMHG | SYSTOLIC BLOOD PRESSURE: 130 MMHG | HEART RATE: 64 BPM | BODY MASS INDEX: 23.53 KG/M2 | WEIGHT: 168.1 LBS

## 2025-08-27 DIAGNOSIS — Z86.711 HISTORY OF PULMONARY EMBOLISM: ICD-10-CM

## 2025-08-27 DIAGNOSIS — Z86.718 HISTORY OF DVT (DEEP VEIN THROMBOSIS): ICD-10-CM

## 2025-08-27 DIAGNOSIS — Q25.43 AORTIC ROOT ANEURYSM: ICD-10-CM

## 2025-08-27 DIAGNOSIS — I77.810 ASCENDING AORTA DILATION: ICD-10-CM

## 2025-08-27 DIAGNOSIS — Z78.9 NEVER SMOKED ANY SUBSTANCE: ICD-10-CM

## 2025-08-27 DIAGNOSIS — I10 ESSENTIAL HYPERTENSION: ICD-10-CM

## 2025-08-27 DIAGNOSIS — E78.2 MIXED HYPERLIPIDEMIA: ICD-10-CM

## 2025-08-27 PROCEDURE — 99214 OFFICE O/P EST MOD 30 MIN: CPT | Performed by: INTERNAL MEDICINE

## 2025-08-27 PROCEDURE — 1159F MED LIST DOCD IN RCRD: CPT | Performed by: INTERNAL MEDICINE

## 2025-08-27 PROCEDURE — 1036F TOBACCO NON-USER: CPT | Performed by: INTERNAL MEDICINE

## 2025-08-27 PROCEDURE — 3075F SYST BP GE 130 - 139MM HG: CPT | Performed by: INTERNAL MEDICINE

## 2025-08-27 PROCEDURE — 3078F DIAST BP <80 MM HG: CPT | Performed by: INTERNAL MEDICINE

## 2025-08-27 ASSESSMENT — COLUMBIA-SUICIDE SEVERITY RATING SCALE - C-SSRS
6. HAVE YOU EVER DONE ANYTHING, STARTED TO DO ANYTHING, OR PREPARED TO DO ANYTHING TO END YOUR LIFE?: NO
1. IN THE PAST MONTH, HAVE YOU WISHED YOU WERE DEAD OR WISHED YOU COULD GO TO SLEEP AND NOT WAKE UP?: NO
2. HAVE YOU ACTUALLY HAD ANY THOUGHTS OF KILLING YOURSELF?: NO

## 2025-08-27 ASSESSMENT — PATIENT HEALTH QUESTIONNAIRE - PHQ9
2. FEELING DOWN, DEPRESSED OR HOPELESS: NOT AT ALL
1. LITTLE INTEREST OR PLEASURE IN DOING THINGS: NOT AT ALL
SUM OF ALL RESPONSES TO PHQ9 QUESTIONS 1 AND 2: 0

## 2025-08-28 ENCOUNTER — TREATMENT (OUTPATIENT)
Dept: PHYSICAL THERAPY | Facility: CLINIC | Age: 80
End: 2025-08-28
Payer: MEDICARE

## 2025-08-28 DIAGNOSIS — R26.89 IMBALANCE: ICD-10-CM

## 2025-08-28 PROCEDURE — 97110 THERAPEUTIC EXERCISES: CPT | Mod: GP

## 2025-09-04 ENCOUNTER — TREATMENT (OUTPATIENT)
Dept: PHYSICAL THERAPY | Facility: CLINIC | Age: 80
End: 2025-09-04
Payer: MEDICARE

## 2025-09-04 DIAGNOSIS — R26.89 IMBALANCE: ICD-10-CM

## 2025-09-04 PROCEDURE — 97110 THERAPEUTIC EXERCISES: CPT | Mod: GP

## 2025-10-02 ENCOUNTER — APPOINTMENT (OUTPATIENT)
Dept: PHYSICAL THERAPY | Facility: CLINIC | Age: 80
End: 2025-10-02
Payer: MEDICARE

## 2026-08-26 ENCOUNTER — APPOINTMENT (OUTPATIENT)
Dept: CARDIOLOGY | Facility: CLINIC | Age: 81
End: 2026-08-26
Payer: MEDICARE